# Patient Record
Sex: FEMALE | Race: WHITE | NOT HISPANIC OR LATINO | URBAN - METROPOLITAN AREA
[De-identification: names, ages, dates, MRNs, and addresses within clinical notes are randomized per-mention and may not be internally consistent; named-entity substitution may affect disease eponyms.]

---

## 2017-12-11 ENCOUNTER — OFFICE (OUTPATIENT)
Dept: URBAN - METROPOLITAN AREA CLINIC 11 | Facility: CLINIC | Age: 22
End: 2017-12-11
Payer: COMMERCIAL

## 2017-12-11 VITALS
WEIGHT: 130 LBS | HEART RATE: 72 BPM | HEIGHT: 62 IN | DIASTOLIC BLOOD PRESSURE: 68 MMHG | SYSTOLIC BLOOD PRESSURE: 112 MMHG

## 2017-12-11 DIAGNOSIS — K59.00 CONSTIPATION, UNSPECIFIED: ICD-10-CM

## 2017-12-11 PROCEDURE — 99243 OFF/OP CNSLTJ NEW/EST LOW 30: CPT | Performed by: INTERNAL MEDICINE

## 2018-09-10 ENCOUNTER — OFFICE (OUTPATIENT)
Dept: URBAN - METROPOLITAN AREA CLINIC 11 | Facility: CLINIC | Age: 23
End: 2018-09-10
Payer: COMMERCIAL

## 2018-09-10 VITALS
HEIGHT: 62 IN | WEIGHT: 133 LBS | SYSTOLIC BLOOD PRESSURE: 112 MMHG | DIASTOLIC BLOOD PRESSURE: 64 MMHG | HEART RATE: 52 BPM

## 2018-09-10 DIAGNOSIS — K59.00 CONSTIPATION, UNSPECIFIED: ICD-10-CM

## 2018-09-10 LAB
CBC, PLATELET, NO DIFFERENTIAL: HEMATOCRIT: 42.4 % (ref 34–46.6)
CBC, PLATELET, NO DIFFERENTIAL: HEMOGLOBIN: 13.7 G/DL (ref 11.1–15.9)
CBC, PLATELET, NO DIFFERENTIAL: MCH: 29.5 PG (ref 26.6–33)
CBC, PLATELET, NO DIFFERENTIAL: MCHC: 32.3 G/DL (ref 31.5–35.7)
CBC, PLATELET, NO DIFFERENTIAL: MCV: 91 FL (ref 79–97)
CBC, PLATELET, NO DIFFERENTIAL: PLATELETS: 259 X10E3/UL (ref 150–379)
CBC, PLATELET, NO DIFFERENTIAL: RBC: 4.64 X10E6/UL (ref 3.77–5.28)
CBC, PLATELET, NO DIFFERENTIAL: RDW: 13.7 % (ref 12.3–15.4)
CBC, PLATELET, NO DIFFERENTIAL: WBC: 9.3 X10E3/UL (ref 3.4–10.8)
IMMUNOGLOBULIN A, QN, SERUM: 112 MG/DL (ref 87–352)
T-TRANSGLUTAMINASE (TTG) IGA: <2 U/ML
TSH: 3.2 UIU/ML (ref 0.45–4.5)

## 2018-09-10 PROCEDURE — 99213 OFFICE O/P EST LOW 20 MIN: CPT | Performed by: INTERNAL MEDICINE

## 2021-01-25 ENCOUNTER — OFFICE (OUTPATIENT)
Dept: URBAN - METROPOLITAN AREA CLINIC 19 | Facility: CLINIC | Age: 26
End: 2021-01-25

## 2021-01-25 VITALS
OXYGEN SATURATION: 99 % | SYSTOLIC BLOOD PRESSURE: 99 MMHG | HEART RATE: 53 BPM | WEIGHT: 131 LBS | DIASTOLIC BLOOD PRESSURE: 60 MMHG | HEIGHT: 61 IN

## 2021-01-25 DIAGNOSIS — K59.00 CONSTIPATION, UNSPECIFIED: ICD-10-CM

## 2021-01-25 DIAGNOSIS — R14.0 ABDOMINAL DISTENSION (GASEOUS): ICD-10-CM

## 2021-01-25 PROCEDURE — 99213 OFFICE O/P EST LOW 20 MIN: CPT

## 2021-01-25 NOTE — SERVICENOTES
The patient's assessment was reviewed with Dr. Naranjo and a collaborative plan of care was established.

## 2021-01-25 NOTE — SERVICEHPINOTES
25-year-old healthy white female returns for complaints of chronic constipation and bloating.  She has had chronic constipation her entire life.  She has been seen in the past by colleague, Kaveh Alas MD. Routine lab work 9/10/18, including TSH and celiac antibody, have been normal.  Abdominal x-ray [facility] 9/14/18 was only remarkable for stool in colon.  She has tried numerous laxatives, including MiraLax, Dulcolax, senna, stool softeners, Amitiza, and Trulance.  Linzess 290 mcg/d has worked best, but last year her insurance did not cover it completely.  She has been unable to afford it since.  She takes an occasional over-the-counter laxative and has bowel movements 1 to 2 times a week.  She has bloating after eating and excess gas.  She has occasional lower abdominal pain that she attributes to her constipation. She denies reflux, heartburn, nausea, vomiting or overt GI bleeding.  She has never had a colonoscopy.  Family history is negative for IBD and colon neoplasm.

## 2021-05-03 ENCOUNTER — OFFICE (OUTPATIENT)
Dept: URBAN - METROPOLITAN AREA CLINIC 19 | Facility: CLINIC | Age: 26
End: 2021-05-03

## 2021-05-03 VITALS
SYSTOLIC BLOOD PRESSURE: 97 MMHG | HEIGHT: 61 IN | WEIGHT: 128 LBS | HEART RATE: 58 BPM | DIASTOLIC BLOOD PRESSURE: 56 MMHG | OXYGEN SATURATION: 99 %

## 2021-05-03 DIAGNOSIS — R11.10 VOMITING, UNSPECIFIED: ICD-10-CM

## 2021-05-03 DIAGNOSIS — R14.0 ABDOMINAL DISTENSION (GASEOUS): ICD-10-CM

## 2021-05-03 DIAGNOSIS — K59.00 CONSTIPATION, UNSPECIFIED: ICD-10-CM

## 2021-05-03 LAB
ALLERGEN PROFILE, BASIC FOOD: CLASS DESCRIPTION: (no result)
ALLERGEN PROFILE, BASIC FOOD: F002-IGE MILK: <0.1 KU/L
ALLERGEN PROFILE, BASIC FOOD: F004-IGE WHEAT: <0.1 KU/L
ALLERGEN PROFILE, BASIC FOOD: F008-IGE CORN: <0.1 KU/L
ALLERGEN PROFILE, BASIC FOOD: F013-IGE PEANUT: <0.1 KU/L
ALLERGEN PROFILE, BASIC FOOD: F014-IGE SOYBEAN: <0.1 KU/L
ALLERGEN PROFILE, BASIC FOOD: F026-IGE PORK: <0.1 KU/L
ALLERGEN PROFILE, BASIC FOOD: F027-IGE BEEF: <0.1 KU/L
ALLERGEN PROFILE, BASIC FOOD: F093-IGE CHOCOLATE/CACAO: <0.1 KU/L
ALLERGEN PROFILE, BASIC FOOD: F245-IGE EGG, WHOLE: <0.1 KU/L
ALLERGEN PROFILE, BASIC FOOD: FX02-IGE FOOD MIX (SEAFOODS): NEGATIVE
C-REACTIVE PROTEIN, QUANT: <1 MG/L
CBC, PLATELET, NO DIFFERENTIAL: HEMATOCRIT: 43 % (ref 34–46.6)
CBC, PLATELET, NO DIFFERENTIAL: HEMOGLOBIN: 13.8 G/DL (ref 11.1–15.9)
CBC, PLATELET, NO DIFFERENTIAL: MCH: 30.7 PG (ref 26.6–33)
CBC, PLATELET, NO DIFFERENTIAL: MCHC: 32.1 G/DL (ref 31.5–35.7)
CBC, PLATELET, NO DIFFERENTIAL: MCV: 96 FL (ref 79–97)
CBC, PLATELET, NO DIFFERENTIAL: PLATELETS: 308 X10E3/UL (ref 150–450)
CBC, PLATELET, NO DIFFERENTIAL: RBC: 4.49 X10E6/UL (ref 3.77–5.28)
CBC, PLATELET, NO DIFFERENTIAL: RDW: 12.5 % (ref 11.7–15.4)
CBC, PLATELET, NO DIFFERENTIAL: WBC: 6.6 X10E3/UL (ref 3.4–10.8)
COMP. METABOLIC PANEL (14): A/G RATIO: 2.2 (ref 1.2–2.2)
COMP. METABOLIC PANEL (14): ALBUMIN: 4.8 G/DL (ref 3.9–5)
COMP. METABOLIC PANEL (14): ALKALINE PHOSPHATASE: 54 IU/L (ref 39–117)
COMP. METABOLIC PANEL (14): ALT (SGPT): 21 IU/L (ref 0–32)
COMP. METABOLIC PANEL (14): AST (SGOT): 33 IU/L (ref 0–40)
COMP. METABOLIC PANEL (14): BILIRUBIN, TOTAL: <0.2 MG/DL
COMP. METABOLIC PANEL (14): BUN/CREATININE RATIO: 19 (ref 9–23)
COMP. METABOLIC PANEL (14): BUN: 20 MG/DL (ref 6–20)
COMP. METABOLIC PANEL (14): CALCIUM: 9.4 MG/DL (ref 8.7–10.2)
COMP. METABOLIC PANEL (14): CARBON DIOXIDE, TOTAL: 22 MMOL/L (ref 20–29)
COMP. METABOLIC PANEL (14): CHLORIDE: 104 MMOL/L (ref 96–106)
COMP. METABOLIC PANEL (14): CREATININE: 1.05 MG/DL — HIGH (ref 0.57–1)
COMP. METABOLIC PANEL (14): EGFR IF AFRICN AM: 87 ML/MIN/1.73 (ref 59–?)
COMP. METABOLIC PANEL (14): EGFR IF NONAFRICN AM: 75 ML/MIN/1.73 (ref 59–?)
COMP. METABOLIC PANEL (14): GLOBULIN, TOTAL: 2.2 G/DL (ref 1.5–4.5)
COMP. METABOLIC PANEL (14): GLUCOSE: 96 MG/DL (ref 65–99)
COMP. METABOLIC PANEL (14): POTASSIUM: 4.5 MMOL/L (ref 3.5–5.2)
COMP. METABOLIC PANEL (14): PROTEIN, TOTAL: 7 G/DL (ref 6–8.5)
COMP. METABOLIC PANEL (14): SODIUM: 140 MMOL/L (ref 134–144)
HCG,BETA SUBUNIT, QNT, SERUM: HCG,BETA SUBUNIT,QNT,SERUM: <1 MIU/ML
SEDIMENTATION RATE-WESTERGREN: 3 MM/HR (ref 0–32)
T-TRANSGLUTAMINASE (TTG) IGA: <2 U/ML
TSH: 1.75 UIU/ML (ref 0.45–4.5)

## 2021-05-03 PROCEDURE — 99214 OFFICE O/P EST MOD 30 MIN: CPT

## 2021-05-03 RX ORDER — AMITRIPTYLINE HYDROCHLORIDE 25 MG/1
25 TABLET, FILM COATED ORAL
Qty: 30 | Refills: 5 | Status: ACTIVE
Start: 2021-05-03

## 2021-05-03 NOTE — SERVICEHPINOTES
25-year-old healthy white female returns for continued complaints of chronic constipation, abdominal discomfort and bloating. She has had chronic constipation her entire life. She has been seen in the past by colleague, Kaveh Alas MD. Routine lab work 9/10/18, including TSH and celiac antibody, have been normal. Abdominal x-ray [facility] 9/14/18 was only remarkable for stool in colon. She has tried numerous laxatives, including MiraLax, Dulcolax, senna, stool softeners, Amitiza, Trulance, and most recently Linzess 290 mcg/d. Linzess 290 mcg/d initially worked best, but her insurance did not cover it completely and then it seemed to become ineffective. She has been unable to afford it since. She takes an occasional over-the-counter laxative and has bowel movements 3 to 4 times a week now. She has bloating after eating and excess gas. She has occasional lower abdominal pain that she attributes to her constipation. CT abdomen/pelvis 2/19/21 was unremarkable.  We have discussed doing a colonoscopy, but her insurance will not cover it and she would prefer not pay for this if possible.She has had a significant amount of stress over the last year and a half, including a global pandemic, broken engagement, the suicide of her best friend, long hours at work, and a stressful graduate school program.  She thinks very likely this stress and anxiety has significantly exacerbated her symptoms, including the abdominal pain and bloating.  She is currently going to a counselor which is helpful, but has wondered about medication.  She denies any specific food sensitivities, but is interested in even meeting with a dietitian.  When she does not eat, her symptoms seems to be resolved.  She has a lot of fear and anxiety surrounding eating as this is typically caused constipation and bloating in the past.  She does eat a lot of protein and a significant amount of fiber as she is very involved in cross fit.  This may be exacerbating her bloating.  She denies dysphagia, nausea, vomiting or overt GI bleeding.  Currently she takes an occasional laxative and has a bowel movement, "on a good week”, every other day. She denies reflux, heartburn, nausea, vomiting or overt GI bleeding. Family history is negative for IBD and colon neoplasm.

## 2021-10-07 ENCOUNTER — OFFICE (OUTPATIENT)
Dept: URBAN - METROPOLITAN AREA CLINIC 19 | Facility: CLINIC | Age: 26
End: 2021-10-07
Payer: OTHER GOVERNMENT

## 2021-10-07 VITALS — HEIGHT: 61 IN

## 2021-10-07 DIAGNOSIS — R14.0 ABDOMINAL DISTENSION (GASEOUS): ICD-10-CM

## 2021-10-07 DIAGNOSIS — K59.00 CONSTIPATION, UNSPECIFIED: ICD-10-CM

## 2021-10-07 RX ORDER — LINACLOTIDE 145 UG/1
CAPSULE, GELATIN COATED ORAL
Qty: 90 | Refills: 3 | Status: ACTIVE
Start: 2021-10-07

## 2021-10-07 NOTE — SERVICENOTES
The patient's assessment was reviewed with Dr. Naranjo and a collaborative plan of care was established. The duration of the telehealth visit was 13 minutes and 29 seconds.

## 2021-10-07 NOTE — SERVICEHPINOTES
26-year-old healthy white female returns routine follow up of her for continued complaints of chronic constipation, abdominal discomfort and bloating.I last saw her on 5/3/21.  She admitted to a significant amount of stress in her life over the last year and a half, including a global pandemic, broke and engagement, the suicide of her best friend, long hours at work, and a stressful graduate school program.  She felt like this was very likely exacerbating her symptoms, especially the abdominal pain and bloating.  I gave her prescription for amitriptyline 25 mg daily to try.  Routine blood work was normal.Since she started the amitriptyline, her symptoms have improved regarding her abdominal discomfort and bloating.  However, she has been much more sluggish and had vivid dreams.  She has talked her counselor about the symptoms who believes that the amitriptyline is causing some of these symptoms.  Her constipation continues to remain the same.  She had previously been on Linzess 145 mcg/d, but unfortunately her insurance would not cover it.  She would like to see if it will be covered now that her insurance has changed.  She denies reflux, heartburn, nausea, vomiting or overt GI bleeding.  She has had chronic constipation her entire life. She has been seen in the past by colleague, Kaveh Alas MD. Routine lab work 9/10/18, including TSH and celiac antibody, have been normal. Abdominal x-ray [facility] 9/14/18 was only remarkable for stool in colon. She has tried numerous laxatives, including MiraLax, Dulcolax, senna, stool softeners, Amitiza, Trulance, and most recently Linzess 145 mcg/d. Linzess 145 mcg/d initially worked best, but her insurance did not cover it completely and then it seemed to become ineffective. She has been unable to afford it since. She takes an occasional over-the-counter laxative and has bowel movements 3 to 4 times a week now. She has bloating after eating and excess gas. She has occasional lower abdominal pain that she attributes to her constipation. CT abdomen/pelvis 2/19/21 was unremarkable. We had discussed doing a colonoscopy, but her insurance will not cover it and she would prefer not pay for this if possible.Family history is negative for IBD and colon neoplasm.